# Patient Record
Sex: FEMALE | Race: WHITE | Employment: OTHER | URBAN - METROPOLITAN AREA
[De-identification: names, ages, dates, MRNs, and addresses within clinical notes are randomized per-mention and may not be internally consistent; named-entity substitution may affect disease eponyms.]

---

## 2017-11-21 ENCOUNTER — HOSPITAL ENCOUNTER (OUTPATIENT)
Dept: CT IMAGING | Age: 57
Discharge: HOME | End: 2017-11-21
Attending: INTERNAL MEDICINE
Payer: COMMERCIAL

## 2017-11-21 DIAGNOSIS — R10.13 DYSPEPSIA: ICD-10-CM

## 2017-11-21 DIAGNOSIS — R10.9 ABDOMINAL PAIN: ICD-10-CM

## 2017-11-21 DIAGNOSIS — M35.4 DIFFUSE (EOSINOPHILIC) FASCIITIS: ICD-10-CM

## 2017-11-21 DIAGNOSIS — K59.00 CONSTIPATION: ICD-10-CM

## 2017-11-21 PROCEDURE — 74011250636 HC RX REV CODE- 250/636: Performed by: INTERNAL MEDICINE

## 2017-11-21 PROCEDURE — 74177 CT ABD & PELVIS W/CONTRAST: CPT

## 2017-11-21 PROCEDURE — 74011000255 HC RX REV CODE- 255: Performed by: INTERNAL MEDICINE

## 2017-11-21 PROCEDURE — 74011636320 HC RX REV CODE- 636/320: Performed by: INTERNAL MEDICINE

## 2017-11-21 RX ORDER — SODIUM CHLORIDE 0.9 % (FLUSH) 0.9 %
10 SYRINGE (ML) INJECTION
Status: COMPLETED | OUTPATIENT
Start: 2017-11-21 | End: 2017-11-21

## 2017-11-21 RX ORDER — SODIUM CHLORIDE 9 MG/ML
50 INJECTION, SOLUTION INTRAVENOUS
Status: COMPLETED | OUTPATIENT
Start: 2017-11-21 | End: 2017-11-21

## 2017-11-21 RX ORDER — BARIUM SULFATE 20 MG/ML
900 SUSPENSION ORAL
Status: COMPLETED | OUTPATIENT
Start: 2017-11-21 | End: 2017-11-21

## 2017-11-21 RX ADMIN — BARIUM SULFATE 900 ML: 21 SUSPENSION ORAL at 07:40

## 2017-11-21 RX ADMIN — Medication 10 ML: at 07:40

## 2017-11-21 RX ADMIN — IOPAMIDOL 100 ML: 755 INJECTION, SOLUTION INTRAVENOUS at 07:40

## 2017-11-21 RX ADMIN — SODIUM CHLORIDE 50 ML/HR: 900 INJECTION, SOLUTION INTRAVENOUS at 07:40

## 2023-09-17 ENCOUNTER — HOSPITAL ENCOUNTER (INPATIENT)
Facility: HOSPITAL | Age: 63
LOS: 2 days | Discharge: HOME | DRG: 897 | End: 2023-09-19
Attending: STUDENT IN AN ORGANIZED HEALTH CARE EDUCATION/TRAINING PROGRAM | Admitting: FAMILY MEDICINE
Payer: COMMERCIAL

## 2023-09-17 DIAGNOSIS — F10.932 ALCOHOL WITHDRAWAL SYNDROME WITH PERCEPTUAL DISTURBANCE (HCC): Primary | ICD-10-CM

## 2023-09-17 DIAGNOSIS — F41.1 ANXIETY STATE: ICD-10-CM

## 2023-09-17 DIAGNOSIS — R45.1 AGITATION: ICD-10-CM

## 2023-09-17 DIAGNOSIS — D58.2 ELEVATED HEMOGLOBIN (HCC): ICD-10-CM

## 2023-09-17 DIAGNOSIS — M32.9 LUPUS (HCC): ICD-10-CM

## 2023-09-17 PROBLEM — F10.10 ALCOHOL ABUSE: Status: ACTIVE | Noted: 2023-09-17

## 2023-09-17 LAB
ALBUMIN SERPL-MCNC: 3.9 G/DL (ref 3.5–5)
ALBUMIN/GLOB SERPL: 1 (ref 1.1–2.2)
ALP SERPL-CCNC: 65 U/L (ref 45–117)
ALT SERPL-CCNC: 30 U/L (ref 12–78)
AMPHET UR QL SCN: NEGATIVE
ANION GAP SERPL CALC-SCNC: 9 MMOL/L (ref 5–15)
APAP SERPL-MCNC: <2 UG/ML (ref 10–30)
APPEARANCE UR: CLEAR
AST SERPL-CCNC: 49 U/L (ref 15–37)
BACTERIA URNS QL MICRO: NEGATIVE /HPF
BARBITURATES UR QL SCN: NEGATIVE
BASOPHILS # BLD: 0.1 K/UL (ref 0–0.1)
BASOPHILS NFR BLD: 1 % (ref 0–1)
BENZODIAZ UR QL: NEGATIVE
BILIRUB SERPL-MCNC: 0.7 MG/DL (ref 0.2–1)
BILIRUB UR QL: NEGATIVE
BUN SERPL-MCNC: 17 MG/DL (ref 6–20)
BUN/CREAT SERPL: 22 (ref 12–20)
CALCIUM SERPL-MCNC: 9 MG/DL (ref 8.5–10.1)
CANNABINOIDS UR QL SCN: NEGATIVE
CHLORIDE SERPL-SCNC: 102 MMOL/L (ref 97–108)
CO2 SERPL-SCNC: 28 MMOL/L (ref 21–32)
COCAINE UR QL SCN: NEGATIVE
COLOR UR: ABNORMAL
COMMENT:: NORMAL
CREAT SERPL-MCNC: 0.78 MG/DL (ref 0.55–1.02)
DIFFERENTIAL METHOD BLD: ABNORMAL
EOSINOPHIL # BLD: 0 K/UL (ref 0–0.4)
EOSINOPHIL NFR BLD: 0 % (ref 0–7)
EPITH CASTS URNS QL MICRO: ABNORMAL /LPF
ERYTHROCYTE [DISTWIDTH] IN BLOOD BY AUTOMATED COUNT: 13 % (ref 11.5–14.5)
ETHANOL SERPL-MCNC: 393 MG/DL (ref 0–0.08)
GLOBULIN SER CALC-MCNC: 4 G/DL (ref 2–4)
GLUCOSE SERPL-MCNC: 87 MG/DL (ref 65–100)
GLUCOSE UR STRIP.AUTO-MCNC: 250 MG/DL
HCT VFR BLD AUTO: 47.9 % (ref 35–47)
HGB BLD-MCNC: 16.7 G/DL (ref 11.5–16)
HGB UR QL STRIP: ABNORMAL
HYALINE CASTS URNS QL MICRO: ABNORMAL /LPF (ref 0–5)
IMM GRANULOCYTES # BLD AUTO: 0 K/UL (ref 0–0.04)
IMM GRANULOCYTES NFR BLD AUTO: 1 % (ref 0–0.5)
KETONES UR QL STRIP.AUTO: NEGATIVE MG/DL
LEUKOCYTE ESTERASE UR QL STRIP.AUTO: NEGATIVE
LYMPHOCYTES # BLD: 1.8 K/UL (ref 0.8–3.5)
LYMPHOCYTES NFR BLD: 24 % (ref 12–49)
Lab: NORMAL
MAGNESIUM SERPL-MCNC: 1.7 MG/DL (ref 1.6–2.4)
MCH RBC QN AUTO: 32.1 PG (ref 26–34)
MCHC RBC AUTO-ENTMCNC: 34.9 G/DL (ref 30–36.5)
MCV RBC AUTO: 92.1 FL (ref 80–99)
METHADONE UR QL: NEGATIVE
MONOCYTES # BLD: 0.4 K/UL (ref 0–1)
MONOCYTES NFR BLD: 6 % (ref 5–13)
NEUTS SEG # BLD: 5.3 K/UL (ref 1.8–8)
NEUTS SEG NFR BLD: 68 % (ref 32–75)
NITRITE UR QL STRIP.AUTO: NEGATIVE
NRBC # BLD: 0 K/UL (ref 0–0.01)
NRBC BLD-RTO: 0 PER 100 WBC
OPIATES UR QL: NEGATIVE
PCP UR QL: NEGATIVE
PH UR STRIP: 5.5 (ref 5–8)
PLATELET # BLD AUTO: 204 K/UL (ref 150–400)
PMV BLD AUTO: 8.8 FL (ref 8.9–12.9)
POTASSIUM SERPL-SCNC: 3.8 MMOL/L (ref 3.5–5.1)
PROT SERPL-MCNC: 7.9 G/DL (ref 6.4–8.2)
PROT UR STRIP-MCNC: ABNORMAL MG/DL
RBC # BLD AUTO: 5.2 M/UL (ref 3.8–5.2)
RBC #/AREA URNS HPF: ABNORMAL /HPF (ref 0–5)
SALICYLATES SERPL-MCNC: <1.7 MG/DL (ref 2.8–20)
SODIUM SERPL-SCNC: 139 MMOL/L (ref 136–145)
SP GR UR REFRACTOMETRY: 1.02 (ref 1–1.03)
SPECIMEN HOLD: NORMAL
UROBILINOGEN UR QL STRIP.AUTO: 0.2 EU/DL (ref 0.2–1)
WBC # BLD AUTO: 7.6 K/UL (ref 3.6–11)
WBC URNS QL MICRO: ABNORMAL /HPF (ref 0–4)

## 2023-09-17 PROCEDURE — 80053 COMPREHEN METABOLIC PANEL: CPT

## 2023-09-17 PROCEDURE — 96360 HYDRATION IV INFUSION INIT: CPT

## 2023-09-17 PROCEDURE — 36415 COLL VENOUS BLD VENIPUNCTURE: CPT

## 2023-09-17 PROCEDURE — 81001 URINALYSIS AUTO W/SCOPE: CPT

## 2023-09-17 PROCEDURE — 2580000003 HC RX 258: Performed by: FAMILY MEDICINE

## 2023-09-17 PROCEDURE — 83735 ASSAY OF MAGNESIUM: CPT

## 2023-09-17 PROCEDURE — 6360000002 HC RX W HCPCS: Performed by: FAMILY MEDICINE

## 2023-09-17 PROCEDURE — 80307 DRUG TEST PRSMV CHEM ANLYZR: CPT

## 2023-09-17 PROCEDURE — 96361 HYDRATE IV INFUSION ADD-ON: CPT

## 2023-09-17 PROCEDURE — 2580000003 HC RX 258

## 2023-09-17 PROCEDURE — 80143 DRUG ASSAY ACETAMINOPHEN: CPT

## 2023-09-17 PROCEDURE — 99285 EMERGENCY DEPT VISIT HI MDM: CPT

## 2023-09-17 PROCEDURE — 85025 COMPLETE CBC W/AUTO DIFF WBC: CPT

## 2023-09-17 PROCEDURE — 2060000000 HC ICU INTERMEDIATE R&B

## 2023-09-17 PROCEDURE — 6370000000 HC RX 637 (ALT 250 FOR IP)

## 2023-09-17 PROCEDURE — 82077 ASSAY SPEC XCP UR&BREATH IA: CPT

## 2023-09-17 PROCEDURE — 80179 DRUG ASSAY SALICYLATE: CPT

## 2023-09-17 RX ORDER — METOCLOPRAMIDE HYDROCHLORIDE 5 MG/ML
10 INJECTION INTRAMUSCULAR; INTRAVENOUS EVERY 6 HOURS
Status: DISCONTINUED | OUTPATIENT
Start: 2023-09-17 | End: 2023-09-19 | Stop reason: HOSPADM

## 2023-09-17 RX ORDER — SODIUM CHLORIDE, SODIUM LACTATE, POTASSIUM CHLORIDE, CALCIUM CHLORIDE 600; 310; 30; 20 MG/100ML; MG/100ML; MG/100ML; MG/100ML
INJECTION, SOLUTION INTRAVENOUS CONTINUOUS
Status: DISCONTINUED | OUTPATIENT
Start: 2023-09-17 | End: 2023-09-19 | Stop reason: HOSPADM

## 2023-09-17 RX ORDER — LORAZEPAM 2 MG/ML
2 INJECTION INTRAMUSCULAR
Status: DISCONTINUED | OUTPATIENT
Start: 2023-09-17 | End: 2023-09-19 | Stop reason: HOSPADM

## 2023-09-17 RX ORDER — ACETAMINOPHEN 650 MG/1
650 SUPPOSITORY RECTAL EVERY 6 HOURS PRN
Status: DISCONTINUED | OUTPATIENT
Start: 2023-09-17 | End: 2023-09-19 | Stop reason: HOSPADM

## 2023-09-17 RX ORDER — FINASTERIDE 5 MG/1
5 TABLET, FILM COATED ORAL DAILY
COMMUNITY

## 2023-09-17 RX ORDER — LORAZEPAM 1 MG/1
1 TABLET ORAL
Status: DISCONTINUED | OUTPATIENT
Start: 2023-09-17 | End: 2023-09-19 | Stop reason: HOSPADM

## 2023-09-17 RX ORDER — SODIUM CHLORIDE 0.9 % (FLUSH) 0.9 %
5-40 SYRINGE (ML) INJECTION PRN
Status: DISCONTINUED | OUTPATIENT
Start: 2023-09-17 | End: 2023-09-19 | Stop reason: HOSPADM

## 2023-09-17 RX ORDER — SODIUM CHLORIDE 9 MG/ML
INJECTION, SOLUTION INTRAVENOUS PRN
Status: DISCONTINUED | OUTPATIENT
Start: 2023-09-17 | End: 2023-09-19 | Stop reason: HOSPADM

## 2023-09-17 RX ORDER — LORAZEPAM 1 MG/1
2 TABLET ORAL
Status: DISCONTINUED | OUTPATIENT
Start: 2023-09-17 | End: 2023-09-19 | Stop reason: HOSPADM

## 2023-09-17 RX ORDER — LANOLIN ALCOHOL/MO/W.PET/CERES
100 CREAM (GRAM) TOPICAL DAILY
Status: DISCONTINUED | OUTPATIENT
Start: 2023-09-17 | End: 2023-09-19 | Stop reason: HOSPADM

## 2023-09-17 RX ORDER — 0.9 % SODIUM CHLORIDE 0.9 %
500 INTRAVENOUS SOLUTION INTRAVENOUS ONCE
Status: COMPLETED | OUTPATIENT
Start: 2023-09-17 | End: 2023-09-17

## 2023-09-17 RX ORDER — LORAZEPAM 2 MG/ML
3 INJECTION INTRAMUSCULAR
Status: DISCONTINUED | OUTPATIENT
Start: 2023-09-17 | End: 2023-09-19 | Stop reason: HOSPADM

## 2023-09-17 RX ORDER — SODIUM CHLORIDE 0.9 % (FLUSH) 0.9 %
5-40 SYRINGE (ML) INJECTION EVERY 12 HOURS SCHEDULED
Status: DISCONTINUED | OUTPATIENT
Start: 2023-09-17 | End: 2023-09-19 | Stop reason: HOSPADM

## 2023-09-17 RX ORDER — TRAZODONE HYDROCHLORIDE 100 MG/1
100 TABLET ORAL NIGHTLY
COMMUNITY

## 2023-09-17 RX ORDER — LORAZEPAM 1 MG/1
4 TABLET ORAL
Status: DISCONTINUED | OUTPATIENT
Start: 2023-09-17 | End: 2023-09-19 | Stop reason: HOSPADM

## 2023-09-17 RX ORDER — LORAZEPAM 2 MG/ML
1 INJECTION INTRAMUSCULAR
Status: DISCONTINUED | OUTPATIENT
Start: 2023-09-17 | End: 2023-09-19 | Stop reason: HOSPADM

## 2023-09-17 RX ORDER — ACETAMINOPHEN 325 MG/1
650 TABLET ORAL EVERY 6 HOURS PRN
Status: DISCONTINUED | OUTPATIENT
Start: 2023-09-17 | End: 2023-09-19 | Stop reason: HOSPADM

## 2023-09-17 RX ORDER — BUPROPION HYDROCHLORIDE 300 MG/1
300 TABLET ORAL EVERY MORNING
COMMUNITY

## 2023-09-17 RX ORDER — POLYETHYLENE GLYCOL 3350 17 G/17G
17 POWDER, FOR SOLUTION ORAL DAILY PRN
Status: DISCONTINUED | OUTPATIENT
Start: 2023-09-17 | End: 2023-09-19 | Stop reason: HOSPADM

## 2023-09-17 RX ORDER — CITALOPRAM 40 MG/1
40 TABLET ORAL DAILY
COMMUNITY

## 2023-09-17 RX ORDER — LORAZEPAM 2 MG/ML
4 INJECTION INTRAMUSCULAR
Status: DISCONTINUED | OUTPATIENT
Start: 2023-09-17 | End: 2023-09-19 | Stop reason: HOSPADM

## 2023-09-17 RX ORDER — ONDANSETRON 4 MG/1
4 TABLET, ORALLY DISINTEGRATING ORAL EVERY 8 HOURS PRN
Status: DISCONTINUED | OUTPATIENT
Start: 2023-09-17 | End: 2023-09-19 | Stop reason: HOSPADM

## 2023-09-17 RX ORDER — ONDANSETRON 2 MG/ML
4 INJECTION INTRAMUSCULAR; INTRAVENOUS EVERY 6 HOURS PRN
Status: DISCONTINUED | OUTPATIENT
Start: 2023-09-17 | End: 2023-09-19 | Stop reason: HOSPADM

## 2023-09-17 RX ORDER — OXYCODONE HYDROCHLORIDE 5 MG/1
5 TABLET ORAL EVERY 4 HOURS PRN
Status: DISCONTINUED | OUTPATIENT
Start: 2023-09-17 | End: 2023-09-19 | Stop reason: HOSPADM

## 2023-09-17 RX ORDER — ENOXAPARIN SODIUM 100 MG/ML
40 INJECTION SUBCUTANEOUS DAILY
Status: DISCONTINUED | OUTPATIENT
Start: 2023-09-17 | End: 2023-09-19 | Stop reason: HOSPADM

## 2023-09-17 RX ORDER — LORAZEPAM 1 MG/1
3 TABLET ORAL
Status: DISCONTINUED | OUTPATIENT
Start: 2023-09-17 | End: 2023-09-19 | Stop reason: HOSPADM

## 2023-09-17 RX ADMIN — SODIUM CHLORIDE, POTASSIUM CHLORIDE, SODIUM LACTATE AND CALCIUM CHLORIDE: 600; 310; 30; 20 INJECTION, SOLUTION INTRAVENOUS at 16:22

## 2023-09-17 RX ADMIN — ENOXAPARIN SODIUM 40 MG: 100 INJECTION SUBCUTANEOUS at 18:57

## 2023-09-17 RX ADMIN — LORAZEPAM 3 MG: 2 TABLET ORAL at 16:22

## 2023-09-17 RX ADMIN — SODIUM CHLORIDE 500 ML: 9 INJECTION, SOLUTION INTRAVENOUS at 13:45

## 2023-09-17 RX ADMIN — Medication 100 MG: at 16:22

## 2023-09-17 ASSESSMENT — PAIN SCALES - GENERAL: PAINLEVEL_OUTOF10: 5

## 2023-09-17 ASSESSMENT — PAIN DESCRIPTION - DESCRIPTORS: DESCRIPTORS: ACHING

## 2023-09-17 ASSESSMENT — PAIN - FUNCTIONAL ASSESSMENT
PAIN_FUNCTIONAL_ASSESSMENT: PREVENTS OR INTERFERES SOME ACTIVE ACTIVITIES AND ADLS
PAIN_FUNCTIONAL_ASSESSMENT: 0-10

## 2023-09-17 ASSESSMENT — PAIN DESCRIPTION - PAIN TYPE: TYPE: CHRONIC PAIN

## 2023-09-17 ASSESSMENT — PAIN DESCRIPTION - LOCATION: LOCATION: GENERALIZED

## 2023-09-17 ASSESSMENT — PAIN DESCRIPTION - FREQUENCY: FREQUENCY: CONTINUOUS

## 2023-09-17 ASSESSMENT — PAIN DESCRIPTION - ORIENTATION: ORIENTATION: ANTERIOR;POSTERIOR;LEFT;RIGHT

## 2023-09-17 ASSESSMENT — ENCOUNTER SYMPTOMS: SHORTNESS OF BREATH: 0

## 2023-09-17 ASSESSMENT — PAIN DESCRIPTION - ONSET: ONSET: ON-GOING

## 2023-09-17 NOTE — ED PROVIDER NOTES
Normal heart sounds. No murmur heard. Pulmonary:      Effort: Pulmonary effort is normal. No respiratory distress. Breath sounds: Normal breath sounds. Abdominal:      General: Abdomen is flat. There is no distension. Palpations: Abdomen is soft. Tenderness: There is no abdominal tenderness. Musculoskeletal:         General: Normal range of motion. Skin:     General: Skin is warm and dry. Neurological:      General: No focal deficit present. Mental Status: She is alert and oriented to person, place, and time. Cranial Nerves: No cranial nerve deficit. Psychiatric:         Attention and Perception: She is inattentive. She does not perceive auditory or visual hallucinations. Mood and Affect: Mood is anxious. Affect is labile and inappropriate. Behavior: Behavior is cooperative. Thought Content: Thought content does not include homicidal or suicidal ideation. Thought content does not include homicidal or suicidal plan. Comments: Laughing then crying during interview.           DIAGNOSTIC RESULTS     EKG: All EKG's are interpreted by the Emergency Department Physician who either signs or Co-signs this chart in the absence of a cardiologist.        RADIOLOGY:   Non-plain film images such as CT, Ultrasound and MRI are read by the radiologist. Plain radiographic images are visualized and preliminarily interpreted by the emergency physician with the below findings:        Interpretation per the Radiologist below, if available at the time of this note:    No orders to display        LABS:  Labs Reviewed   CBC WITH AUTO DIFFERENTIAL - Abnormal; Notable for the following components:       Result Value    Hemoglobin 16.7 (*)     Hematocrit 47.9 (*)     MPV 8.8 (*)     Immature Granulocytes 1 (*)     All other components within normal limits   COMPREHENSIVE METABOLIC PANEL - Abnormal; Notable for the following components:    Bun/Cre Ratio 22 (*)     AST 49 (*)

## 2023-09-17 NOTE — ED NOTES
Patient removed herself from monitor and was found at the sink drinking water. Patient becoming more agitated. Screamed at nurse: \"just give me some fucking water. \" Patient reoriented to room and aware of fall risk. Placed back on monitor and on bed alarm. Water provided.       Narinder Concepcion RN  09/17/23 [id number]

## 2023-09-17 NOTE — ED NOTES
Poison control called and was provided with updates on labs and vital signs. Per poison control, will continue to monitor with CIWA scale and medicate as needed.        Paulette Moura RN  09/17/23 9061

## 2023-09-17 NOTE — ED TRIAGE NOTES
Patient has been taking an increased amount of ETOH, Benadryl and Trazaone. Patient denies wanting to kill self but states wanting to stop the pain. Patient states having a flare up of Lupus and is having increased in pain. Patient has history of alcoholism. + Dizziness, + falls.

## 2023-09-17 NOTE — ED NOTES
MAYO Ralph aware of current CIWA score. Patient concerned that she is withdrawing. Declined reglan.        Jazmyn Vo RN  09/17/23 7530

## 2023-09-17 NOTE — H&P
alcohol rehab    Polycythemia  -Likely related to dehydration  -IV hydration with LR, monitor CBC    Mood disorder  -Patient with labile mood during assessment in the ER  -Supportive care, consider atypical antipsychotic for agitations    SLE  -Follow-up outpatient    Polypharmacy with possible overdose  -Continue cardiac monitor, follow QT  -Supportive management    Estimated length of stay is 2 midnights. DIET: No diet orders on file   ISOLATION PRECAUTIONS: No active isolations  CODE STATUS: No Order   DVT PROPHYLAXIS: Lovenox  FUNCTIONAL STATUS PRIOR TO HOSPITALIZATION: Fully active and ambulatory; able to carry on all self-care without restriction. Ambulatory status/function: By self   EARLY MOBILITY ASSESSMENT: Recommend routine ambulation while hospitalized with the assistance of nursing staff  ANTICIPATED DISCHARGE: 24-48 hours. ANTICIPATED DISPOSITION: Home  EMERGENCY CONTACT/SURROGATE DECISION MAKER:     CRITICAL CARE WAS PERFORMED FOR THIS ENCOUNTER: NO.      Signed By: Kitty Schreiber MD     September 17, 2023         Please note that this dictation may have been completed with Dragon, the computer voice recognition software. Quite often unanticipated grammatical, syntax, homophones, and other interpretive errors are inadvertently transcribed by the computer software. Please disregard these errors. Please excuse any errors that have escaped final proofreading.

## 2023-09-18 LAB
ANION GAP SERPL CALC-SCNC: 4 MMOL/L (ref 5–15)
BASOPHILS # BLD: 0 K/UL (ref 0–0.1)
BASOPHILS NFR BLD: 1 % (ref 0–1)
BUN SERPL-MCNC: 14 MG/DL (ref 6–20)
BUN/CREAT SERPL: 23 (ref 12–20)
CALCIUM SERPL-MCNC: 8.8 MG/DL (ref 8.5–10.1)
CHLORIDE SERPL-SCNC: 104 MMOL/L (ref 97–108)
CO2 SERPL-SCNC: 31 MMOL/L (ref 21–32)
CREAT SERPL-MCNC: 0.61 MG/DL (ref 0.55–1.02)
DIFFERENTIAL METHOD BLD: ABNORMAL
EOSINOPHIL # BLD: 0.1 K/UL (ref 0–0.4)
EOSINOPHIL NFR BLD: 1 % (ref 0–7)
ERYTHROCYTE [DISTWIDTH] IN BLOOD BY AUTOMATED COUNT: 12.9 % (ref 11.5–14.5)
GLUCOSE SERPL-MCNC: 104 MG/DL (ref 65–100)
HCT VFR BLD AUTO: 38.2 % (ref 35–47)
HGB BLD-MCNC: 13.2 G/DL (ref 11.5–16)
IMM GRANULOCYTES # BLD AUTO: 0 K/UL (ref 0–0.04)
IMM GRANULOCYTES NFR BLD AUTO: 0 % (ref 0–0.5)
LYMPHOCYTES # BLD: 1.1 K/UL (ref 0.8–3.5)
LYMPHOCYTES NFR BLD: 23 % (ref 12–49)
MCH RBC QN AUTO: 32 PG (ref 26–34)
MCHC RBC AUTO-ENTMCNC: 34.6 G/DL (ref 30–36.5)
MCV RBC AUTO: 92.5 FL (ref 80–99)
MONOCYTES # BLD: 0.5 K/UL (ref 0–1)
MONOCYTES NFR BLD: 11 % (ref 5–13)
NEUTS SEG # BLD: 3.2 K/UL (ref 1.8–8)
NEUTS SEG NFR BLD: 65 % (ref 32–75)
NRBC # BLD: 0 K/UL (ref 0–0.01)
NRBC BLD-RTO: 0 PER 100 WBC
PLATELET # BLD AUTO: 143 K/UL (ref 150–400)
PMV BLD AUTO: 8.9 FL (ref 8.9–12.9)
POTASSIUM SERPL-SCNC: 3.7 MMOL/L (ref 3.5–5.1)
RBC # BLD AUTO: 4.13 M/UL (ref 3.8–5.2)
SODIUM SERPL-SCNC: 139 MMOL/L (ref 136–145)
WBC # BLD AUTO: 5 K/UL (ref 3.6–11)

## 2023-09-18 PROCEDURE — 36415 COLL VENOUS BLD VENIPUNCTURE: CPT

## 2023-09-18 PROCEDURE — 85025 COMPLETE CBC W/AUTO DIFF WBC: CPT

## 2023-09-18 PROCEDURE — 6370000000 HC RX 637 (ALT 250 FOR IP): Performed by: FAMILY MEDICINE

## 2023-09-18 PROCEDURE — 80048 BASIC METABOLIC PNL TOTAL CA: CPT

## 2023-09-18 PROCEDURE — 6360000002 HC RX W HCPCS: Performed by: NURSE PRACTITIONER

## 2023-09-18 PROCEDURE — 2580000003 HC RX 258: Performed by: FAMILY MEDICINE

## 2023-09-18 PROCEDURE — 6370000000 HC RX 637 (ALT 250 FOR IP): Performed by: NURSE PRACTITIONER

## 2023-09-18 PROCEDURE — 6360000002 HC RX W HCPCS: Performed by: FAMILY MEDICINE

## 2023-09-18 PROCEDURE — 1100000000 HC RM PRIVATE

## 2023-09-18 RX ORDER — PHENOBARBITAL SODIUM 65 MG/ML
32.5 INJECTION INTRAMUSCULAR EVERY 6 HOURS PRN
Status: DISCONTINUED | OUTPATIENT
Start: 2023-09-18 | End: 2023-09-19 | Stop reason: HOSPADM

## 2023-09-18 RX ORDER — CITALOPRAM 20 MG/1
40 TABLET ORAL DAILY
Status: DISCONTINUED | OUTPATIENT
Start: 2023-09-19 | End: 2023-09-19 | Stop reason: HOSPADM

## 2023-09-18 RX ORDER — BUPROPION HYDROCHLORIDE 150 MG/1
150 TABLET, EXTENDED RELEASE ORAL 2 TIMES DAILY
Status: DISCONTINUED | OUTPATIENT
Start: 2023-09-18 | End: 2023-09-19 | Stop reason: HOSPADM

## 2023-09-18 RX ORDER — BUPROPION HYDROCHLORIDE 75 MG/1
150 TABLET ORAL 2 TIMES DAILY
Status: DISCONTINUED | OUTPATIENT
Start: 2023-09-18 | End: 2023-09-18

## 2023-09-18 RX ORDER — PHENOBARBITAL SODIUM 65 MG/ML
16.2 INJECTION INTRAMUSCULAR 2 TIMES DAILY
Status: DISCONTINUED | OUTPATIENT
Start: 2023-09-20 | End: 2023-09-19 | Stop reason: HOSPADM

## 2023-09-18 RX ORDER — PHENOBARBITAL SODIUM 65 MG/ML
32.5 INJECTION INTRAMUSCULAR 4 TIMES DAILY
Status: DISPENSED | OUTPATIENT
Start: 2023-09-18 | End: 2023-09-19

## 2023-09-18 RX ORDER — PHENOBARBITAL SODIUM 65 MG/ML
16.2 INJECTION INTRAMUSCULAR EVERY 6 HOURS PRN
Status: DISCONTINUED | OUTPATIENT
Start: 2023-09-20 | End: 2023-09-19 | Stop reason: HOSPADM

## 2023-09-18 RX ORDER — PHENOBARBITAL SODIUM 65 MG/ML
32.5 INJECTION INTRAMUSCULAR 2 TIMES DAILY
Status: DISCONTINUED | OUTPATIENT
Start: 2023-09-19 | End: 2023-09-19 | Stop reason: HOSPADM

## 2023-09-18 RX ADMIN — METOCLOPRAMIDE HYDROCHLORIDE 10 MG: 5 INJECTION INTRAMUSCULAR; INTRAVENOUS at 10:11

## 2023-09-18 RX ADMIN — LORAZEPAM 1 MG: 0.5 TABLET ORAL at 20:42

## 2023-09-18 RX ADMIN — METOCLOPRAMIDE HYDROCHLORIDE 10 MG: 5 INJECTION INTRAMUSCULAR; INTRAVENOUS at 20:42

## 2023-09-18 RX ADMIN — ENOXAPARIN SODIUM 40 MG: 100 INJECTION SUBCUTANEOUS at 10:11

## 2023-09-18 RX ADMIN — BUPROPION HYDROCHLORIDE 150 MG: 150 TABLET, EXTENDED RELEASE ORAL at 15:06

## 2023-09-18 RX ADMIN — ONDANSETRON 4 MG: 2 INJECTION INTRAMUSCULAR; INTRAVENOUS at 04:43

## 2023-09-18 RX ADMIN — PHENOBARBITAL SODIUM 32.5 MG: 65 INJECTION INTRAMUSCULAR at 10:11

## 2023-09-18 RX ADMIN — SODIUM CHLORIDE, POTASSIUM CHLORIDE, SODIUM LACTATE AND CALCIUM CHLORIDE: 600; 310; 30; 20 INJECTION, SOLUTION INTRAVENOUS at 04:52

## 2023-09-18 RX ADMIN — Medication 100 MG: at 10:11

## 2023-09-18 RX ADMIN — OXYCODONE HYDROCHLORIDE 5 MG: 5 TABLET ORAL at 15:09

## 2023-09-18 RX ADMIN — LORAZEPAM 1 MG: 2 INJECTION INTRAMUSCULAR; INTRAVENOUS at 04:52

## 2023-09-18 ASSESSMENT — PAIN SCALES - GENERAL: PAINLEVEL_OUTOF10: 6

## 2023-09-18 NOTE — ED NOTES
Assumed care of pt at this time. Pt a+o, denies any pain. Additional blanket applied per request.  Call bell within reach.      Jarret Rosas RN  09/18/23 [id number]

## 2023-09-18 NOTE — BSMART NOTE
Spoke w/ the ED Medical Provider, MAYO Alfaro who confirms pt is being recommended for medical admission at this time and will consult psychiatry.
discharge. The patient's source of income comes from family and [employer] (was ). The patient's greatest support comes from spouse and this person will be involved with the treatment. The patient has not been in an event described as horrible or outside the realm of ordinary life experience either currently or in the past. The patient has not been a victim of sexual/physical abuse. Section VII - Other Areas of Clinical Concern    The highest grade achieved is college with the overall quality of school experience being described as n/a. The patient is currently retired and speaks [place] as a primary language. The patient has no communication impairments affecting communication. The patient's preference for learning can be described as: can read and write adequately.   The patient's hearing is normal.  The patient's vision is normal.      BLANCA Ohara

## 2023-09-18 NOTE — CONSULTS
PSYCHIATRY CONSULT NOTE    REASON FOR CONSULT: Alcohol use      HISTORY OF PRESENTING COMPLAINT:  Keren Artis is a 58 y.o. White (non-) female who is currently seen in the ED at [facility]. Patient came in due to alcohol intoxication. She came in with an alcohol level of 393. Reportedly, she has also been self medicating with alcohol in combination with 2-3 pills of benadryl and trazodone to cope with lupus symptoms. On assessment today, she is alert, calm and cooperative during the assessment. Also tremulous and yarning multiple times during the assessment. She reports she feels better today. She reports lupus flare up 2 weeks ago when she went to the beach and has been using trazodone, benadryl and alcohol to cope with her symptoms. She states she was taking 2 pills each of trazodone and benadryl q 4 hours for the past 2 weeks(dosage of benadryl is unknown, states it's over the counter benadryl, I'm assuming 25mg. States trazodone is 50mg). She is adamant that this was not a suicide attempt but to sleep and not feel the pains from lupus. She denies hx of suicide attempt. She denies current SI/HI/AVH. She denies depressive symptoms including hopelessness, anhedonia, anergia and lack of motivation. She reports poor sleep and appetite since the lupus flare up. But reports she was hungry and ask when breakfast will be served. She denies paranoia and delusions and is not observed responding to internal stimuli. PAST PSYCHIATRIC HISTORY:  Denies hx of inpatient psych admission and suicide attempt. Reports a hx of depression and has been taking Celexa, Wellbutrin and trazodone for years through her PCP. Does not currently have outpatient [address] [place] [address] services. SUBSTANCE ABUSE HISTORY:  -Alcohol use: Patient reports alcohol use on and off. Has been sober for 6 years and relapse during the Covid period. Recently she was sober for months and relapse again one weeks ago due to lupus flare up.  She states

## 2023-09-18 NOTE — ED NOTES
Poison control updated on pt status. Per pc they do not feel she will have anymore events related to her ingestion prior to arriving hospital so case will be closed out.         Jarret Rosas RN  09/18/23 9229

## 2023-09-19 VITALS
DIASTOLIC BLOOD PRESSURE: 100 MMHG | BODY MASS INDEX: 20.83 KG/M2 | WEIGHT: 129.63 LBS | HEART RATE: 64 BPM | RESPIRATION RATE: 16 BRPM | HEIGHT: 66 IN | OXYGEN SATURATION: 96 % | TEMPERATURE: 98.1 F | SYSTOLIC BLOOD PRESSURE: 176 MMHG

## 2023-09-19 LAB
ANION GAP SERPL CALC-SCNC: 2 MMOL/L (ref 5–15)
BUN SERPL-MCNC: 9 MG/DL (ref 6–20)
BUN/CREAT SERPL: 16 (ref 12–20)
CALCIUM SERPL-MCNC: 8.7 MG/DL (ref 8.5–10.1)
CHLORIDE SERPL-SCNC: 105 MMOL/L (ref 97–108)
CO2 SERPL-SCNC: 32 MMOL/L (ref 21–32)
COMMENT:: NORMAL
CREAT SERPL-MCNC: 0.58 MG/DL (ref 0.55–1.02)
GLUCOSE SERPL-MCNC: 102 MG/DL (ref 65–100)
MAGNESIUM SERPL-MCNC: 1.7 MG/DL (ref 1.6–2.4)
POTASSIUM SERPL-SCNC: 3.3 MMOL/L (ref 3.5–5.1)
SODIUM SERPL-SCNC: 139 MMOL/L (ref 136–145)
SPECIMEN HOLD: NORMAL

## 2023-09-19 PROCEDURE — 6360000002 HC RX W HCPCS: Performed by: NURSE PRACTITIONER

## 2023-09-19 PROCEDURE — 36415 COLL VENOUS BLD VENIPUNCTURE: CPT

## 2023-09-19 PROCEDURE — 83735 ASSAY OF MAGNESIUM: CPT

## 2023-09-19 PROCEDURE — 6370000000 HC RX 637 (ALT 250 FOR IP): Performed by: FAMILY MEDICINE

## 2023-09-19 PROCEDURE — 6360000002 HC RX W HCPCS: Performed by: FAMILY MEDICINE

## 2023-09-19 PROCEDURE — 6370000000 HC RX 637 (ALT 250 FOR IP): Performed by: NURSE PRACTITIONER

## 2023-09-19 PROCEDURE — 80048 BASIC METABOLIC PNL TOTAL CA: CPT

## 2023-09-19 PROCEDURE — 2580000003 HC RX 258: Performed by: FAMILY MEDICINE

## 2023-09-19 RX ORDER — POTASSIUM CHLORIDE 750 MG/1
40 TABLET, FILM COATED, EXTENDED RELEASE ORAL ONCE
Status: COMPLETED | OUTPATIENT
Start: 2023-09-19 | End: 2023-09-19

## 2023-09-19 RX ADMIN — CITALOPRAM HYDROBROMIDE 40 MG: 20 TABLET ORAL at 09:59

## 2023-09-19 RX ADMIN — METOCLOPRAMIDE HYDROCHLORIDE 10 MG: 5 INJECTION INTRAMUSCULAR; INTRAVENOUS at 02:23

## 2023-09-19 RX ADMIN — BUPROPION HYDROCHLORIDE 150 MG: 150 TABLET, EXTENDED RELEASE ORAL at 09:59

## 2023-09-19 RX ADMIN — SODIUM CHLORIDE, PRESERVATIVE FREE 10 ML: 5 INJECTION INTRAVENOUS at 10:01

## 2023-09-19 RX ADMIN — SODIUM CHLORIDE, POTASSIUM CHLORIDE, SODIUM LACTATE AND CALCIUM CHLORIDE 125 ML: 600; 310; 30; 20 INJECTION, SOLUTION INTRAVENOUS at 00:20

## 2023-09-19 RX ADMIN — Medication 100 MG: at 09:59

## 2023-09-19 RX ADMIN — POTASSIUM CHLORIDE 40 MEQ: 750 TABLET, FILM COATED, EXTENDED RELEASE ORAL at 09:59

## 2023-09-19 RX ADMIN — METOCLOPRAMIDE HYDROCHLORIDE 10 MG: 5 INJECTION INTRAMUSCULAR; INTRAVENOUS at 09:59

## 2023-09-19 RX ADMIN — SODIUM CHLORIDE, PRESERVATIVE FREE 10 ML: 5 INJECTION INTRAVENOUS at 10:00

## 2023-09-19 NOTE — CONSULTS
PSYCHIATRY CONSULT NOTE    REASON FOR CONSULT: Alcohol use    Interval history  9/19/23: Ms Raul Echols reports doing better and asked about discharge. She appears to have improved from yesterday, denies SI/HI/AVH. He states she slept well and eating well. She is interested in IOP. She also wants a referral to a rheumatologist. She denies any concerns at this time. No behavioral issues per nursing staff. HISTORY OF PRESENTING COMPLAINT:  Sidney Frausto is a 58 y.o. White (non-) female who is currently seen in the ED at [facility]. Patient came in due to alcohol intoxication. She came in with an alcohol level of 393. Reportedly, she has also been self medicating with alcohol in combination with 2-3 pills of benadryl and trazodone to cope with lupus symptoms. On assessment today, she is alert, calm and cooperative during the assessment. Also tremulous and yarning multiple times during the assessment. She reports she feels better today. She reports lupus flare up 2 weeks ago when she went to the beach and has been using trazodone, benadryl and alcohol to cope with her symptoms. She states she was taking 2 pills each of trazodone and benadryl q 4 hours for the past 2 weeks(dosage of benadryl is unknown, states it's over the counter benadryl, I'm assuming 25mg. States trazodone is 50mg). She is adamant that this was not a suicide attempt but to sleep and not feel the pains from lupus. She denies hx of suicide attempt. She denies current SI/HI/AVH. She denies depressive symptoms including hopelessness, anhedonia, anergia and lack of motivation. She reports poor sleep and appetite since the lupus flare up. But reports she was hungry and ask when breakfast will be served. She denies paranoia and delusions and is not observed responding to internal stimuli. PAST PSYCHIATRIC HISTORY:  Denies hx of inpatient psych admission and suicide attempt.  Reports a hx of depression and has been taking Celexa,

## 2023-09-19 NOTE — ED NOTES
.. TRANSFER - OUT REPORT:    Verbal report given to simone Figueroa  being transferred to  for routine progression of patient care       Report consisted of patient's Situation, Background, Assessment and   Recommendations(SBAR). Information from the following report(s) Nurse Handoff Report, ED SBAR, Adult Overview, Recent Results, and Neuro Assessment was reviewed with the receiving nurse. Mayer Fall Assessment:    Presents to emergency department  because of falls (Syncope, seizure, or loss of consciousness): No  Age > 70: No  Altered Mental Status, Intoxication with alcohol or substance confusion (Disorientation, impaired judgment, poor safety awaremess, or inability to follow instructions): Yes  Impaired Mobility: Ambulates or transfers with assistive devices or assistance; Unable to ambulate or transer.: Yes  Nursing Judgement: Yes          Lines:   Peripheral IV 09/18/23 Right Forearm (Active)        Opportunity for questions and clarification was provided.       Patient transported with:  Registered Nurse          Mallika Barba RN  09/18/23 [id number]

## 2023-09-19 NOTE — CARE COORDINATION
Services At/After Discharge [facility]  (list of outpatient and inpatient subsatnce abuse programs provided to patient)   Irasema Provided?  No   Mode of Transport at Discharge Other (see comment)  ()      JOSEFINA Bledsoe

## 2023-11-13 ENCOUNTER — OFFICE VISIT (OUTPATIENT)
Age: 63
End: 2023-11-13
Payer: COMMERCIAL

## 2023-11-13 ENCOUNTER — HOSPITAL ENCOUNTER (OUTPATIENT)
Facility: HOSPITAL | Age: 63
Discharge: HOME | End: 2023-11-15
Attending: PSYCHIATRY & NEUROLOGY
Payer: COMMERCIAL

## 2023-11-13 ENCOUNTER — TELEPHONE (OUTPATIENT)
Age: 63
End: 2023-11-13

## 2023-11-13 VITALS
DIASTOLIC BLOOD PRESSURE: 80 MMHG | RESPIRATION RATE: 18 BRPM | BODY MASS INDEX: 23.11 KG/M2 | OXYGEN SATURATION: 98 % | SYSTOLIC BLOOD PRESSURE: 130 MMHG | TEMPERATURE: 97.3 F | HEART RATE: 51 BPM | HEIGHT: 66 IN | WEIGHT: 143.8 LBS

## 2023-11-13 DIAGNOSIS — I65.23 BILATERAL CAROTID ARTERY STENOSIS: ICD-10-CM

## 2023-11-13 DIAGNOSIS — I67.89 CEREBRAL MICROVASCULAR DISEASE: ICD-10-CM

## 2023-11-13 DIAGNOSIS — E55.9 VITAMIN D DEFICIENCY: Primary | ICD-10-CM

## 2023-11-13 DIAGNOSIS — E53.8 VITAMIN B12 DEFICIENCY: ICD-10-CM

## 2023-11-13 DIAGNOSIS — R41.3 DISTURBANCE OF MEMORY: ICD-10-CM

## 2023-11-13 DIAGNOSIS — F90.2 ATTENTION DEFICIT HYPERACTIVITY DISORDER (ADHD), COMBINED TYPE: ICD-10-CM

## 2023-11-13 LAB
ECHO BSA: 1.74 M2
VAS LEFT CCA DIST EDV: 25.4 CM/S
VAS LEFT CCA DIST PSV: 83.7 CM/S
VAS LEFT CCA PROX EDV: 23.5 CM/S
VAS LEFT CCA PROX PSV: 92.9 CM/S
VAS LEFT ECA EDV: 10.8 CM/S
VAS LEFT ECA PSV: 91 CM/S
VAS LEFT ICA DIST EDV: 23.5 CM/S
VAS LEFT ICA DIST PSV: 54.6 CM/S
VAS LEFT ICA MID EDV: 27.2 CM/S
VAS LEFT ICA MID PSV: 67.3 CM/S
VAS LEFT ICA PROX EDV: 16.2 CM/S
VAS LEFT ICA PROX PSV: 81.9 CM/S
VAS LEFT ICA/CCA PSV: 1 NO UNITS
VAS LEFT SUBCLAVIAN PROX EDV: 0 CM/S
VAS LEFT SUBCLAVIAN PROX PSV: 87.4 CM/S
VAS LEFT VERTEBRAL EDV: 19.9 CM/S
VAS LEFT VERTEBRAL PSV: 72.8 CM/S
VAS RIGHT CCA DIST EDV: 25.4 CM/S
VAS RIGHT CCA DIST PSV: 72.9 CM/S
VAS RIGHT CCA PROX EDV: 23.6 CM/S
VAS RIGHT CCA PROX PSV: 96.6 CM/S
VAS RIGHT ECA EDV: 9 CM/S
VAS RIGHT ECA PSV: 72.9 CM/S
VAS RIGHT ICA DIST EDV: 30.9 CM/S
VAS RIGHT ICA DIST PSV: 82 CM/S
VAS RIGHT ICA MID EDV: 30.9 CM/S
VAS RIGHT ICA MID PSV: 109.4 CM/S
VAS RIGHT ICA PROX EDV: 25.4 CM/S
VAS RIGHT ICA PROX PSV: 78.4 CM/S
VAS RIGHT ICA/CCA PSV: 1.5 NO UNITS
VAS RIGHT SUBCLAVIAN PROX EDV: 0 CM/S
VAS RIGHT SUBCLAVIAN PROX PSV: 114.9 CM/S
VAS RIGHT VERTEBRAL EDV: 12.6 CM/S
VAS RIGHT VERTEBRAL PSV: 41.8 CM/S

## 2023-11-13 PROCEDURE — G8484 FLU IMMUNIZE NO ADMIN: HCPCS | Performed by: PSYCHIATRY & NEUROLOGY

## 2023-11-13 PROCEDURE — 93880 EXTRACRANIAL BILAT STUDY: CPT | Performed by: PSYCHIATRY & NEUROLOGY

## 2023-11-13 PROCEDURE — 99205 OFFICE O/P NEW HI 60 MIN: CPT | Performed by: PSYCHIATRY & NEUROLOGY

## 2023-11-13 PROCEDURE — G8427 DOCREV CUR MEDS BY ELIG CLIN: HCPCS | Performed by: PSYCHIATRY & NEUROLOGY

## 2023-11-13 PROCEDURE — 93880 EXTRACRANIAL BILAT STUDY: CPT

## 2023-11-13 PROCEDURE — G8420 CALC BMI NORM PARAMETERS: HCPCS | Performed by: PSYCHIATRY & NEUROLOGY

## 2023-11-13 RX ORDER — ACETAMINOPHEN 160 MG
TABLET,DISINTEGRATING ORAL
COMMUNITY

## 2023-11-13 ASSESSMENT — PATIENT HEALTH QUESTIONNAIRE - PHQ9
SUM OF ALL RESPONSES TO PHQ QUESTIONS 1-9: 0
1. LITTLE INTEREST OR PLEASURE IN DOING THINGS: 0
SUM OF ALL RESPONSES TO PHQ QUESTIONS 1-9: 0
SUM OF ALL RESPONSES TO PHQ QUESTIONS 1-9: 0
2. FEELING DOWN, DEPRESSED OR HOPELESS: 0
SUM OF ALL RESPONSES TO PHQ9 QUESTIONS 1 & 2: 0
SUM OF ALL RESPONSES TO PHQ QUESTIONS 1-9: 0

## 2023-11-13 NOTE — TELEPHONE ENCOUNTER
PASCUAL Beavervd a call from Madi at [facility] vascular lab that he worked the pt in today for her doppler

## 2023-11-14 LAB
25(OH)D3 SERPL-MCNC: 56.9 NG/ML (ref 30–100)
FOLATE SERPL-MCNC: 21.9 NG/ML (ref 5–21)
VIT B12 SERPL-MCNC: 900 PG/ML (ref 193–986)

## 2023-11-14 NOTE — PROGRESS NOTES
Consult  REFERRED BY:  Harshad Brown MD    CHIEF COMPLAINT: Memory loss for the last 3 years. Subjective:     Toby Husbands is a 58 y.o. right-handed  female we are seeing as a new patient, at the request of Dr. Bacilio Helton for new problem of memory loss that has been progressive for the last 3 years and seems to be involve more forgetting where she puts things, forgetting to close refrigerator door, to turn off the water faucet, and noticed by her  but not many other people. 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 She majors in finance. She thinks both of her parents had dementia, and the father was diagnosed with Alzheimer disease in her mother had cognitive issues before she  of breast cancer. The patient had no unusual trauma, but has been depressed for years, but actually does not feel increasingly depressed and actually feels better than she has in a long time. She has she exercises quite a bit and tries eat a good diet, and tries to stay mentally active also. She has no other family history of problems of memory loss, and no other major metabolic or medical problems because of dementia, but did have a history of alcohol abuse in the past, even as recently as of 2023, but says she has now quit drinking for good. We encouraged her to take a multivitamin with vitamin D every day, in addition to continue to stay physically active mentally active and eating a Mediterranean type diet which she will try to do, because that she has not been doing eating lots of carbohydrates. She takes Wellbutrin, Celexa, and trazodone for her depression has been on those doses for quite some time and sees her therapist regularly. We encouraged her to take multivitamins and a vitamin D on a regular basis also. She has never had a history of TIA or stroke or major head trauma.   She has had no imaging of the head in the past, and had thyroid test by her

## 2023-11-20 ENCOUNTER — CLINICAL DOCUMENTATION (OUTPATIENT)
Age: 63
End: 2023-11-20

## 2023-11-20 NOTE — PROGRESS NOTES
I called the patient, no answer, left message for Dopplers were fine showed minimal disease and she should check MyChart for results of her test

## 2024-08-01 RX ORDER — OXYCODONE HYDROCHLORIDE 5 MG/1
5 TABLET ORAL EVERY 4 HOURS PRN
Status: ON HOLD | COMMUNITY
Start: 2024-07-17 | End: 2024-08-07

## 2024-08-01 NOTE — PERIOP NOTE
an antibacterial soap, such as Dial or the soap provided at your preassessment appointment. If needed, ask someone for help to reach all areas of your body. Don’t forget to clean your belly button! Rinse.  With bottle of Hibiclens/Chlorhexidine in hand, turn water off.  Apply Hibiclens antiseptic skin cleanser with a clean, freshly washed washcloth.  Gently apply to your body from chin to toes (except the genital area) and especially the area(s) where your incision(s) will be.  Leave Hibiclens/Chlorhexidine on your skin for at least 20 seconds.     CAUTION: If needed, Hibiclens/chlorhexidine may be used to clean the folds of skin of the legs (such as in the area of the groin) and on your buttocks and hips. However, do not use Hibiclens/Chlorhexidine above the neck or in the genital area (your bottom) or put inside any area of your body.  Turn the water back on and rinse.  Dry gently with a clean, freshly washed towel.  After your shower, do not use any powder, deodorant, perfumes or lotion prior to surgery.  Put on clean, freshly washed clothing.    Tips to help prevent infections after your surgery:  Protect your surgical wound from germs:  Hand washing is the most important thing you and your caregivers can do to prevent infections.  Keep your bandage clean and dry!  Do not touch your surgical wound.  Use clean, freshly washed towels and washcloths every time you shower; do not share bath linens with others.  Until your surgical wound is healed, wear clothing and sleep on bed linens each day that are clean and freshly washed.  Do not allow pets to sleep in your bed with you or touch your surgical wound.  Do not smoke - smoking delays wound healing. This may be a good time to stop smoking.  If you have diabetes, it is important for you to manage your blood sugar levels properly before your surgery as well as after your surgery. Poorly managed blood sugar levels slow down wound healing and prevent you from healing

## 2024-08-06 ENCOUNTER — ANESTHESIA EVENT (OUTPATIENT)
Facility: HOSPITAL | Age: 64
End: 2024-08-06
Payer: MEDICARE

## 2024-08-06 NOTE — ANESTHESIA PRE PROCEDURE
Department of Anesthesiology  Preprocedure Note       Name:  Luz Hills   Age:  64 y.o.  :  1960                                          MRN:  [id number]         Date:  2024      Surgeon: Surgeon(s):  Mickey May MD    Procedure: Procedure(s):  LEFT OPEN REDUCTION INTERNAL FIXATION OF CLAVICLE FRACTURE (ANES GENERAL WITH REGIONAL BLOCK)    Medications prior to admission:   Prior to Admission medications    Medication Sig Start Date End Date Taking? Authorizing Provider   oxyCODONE (ROXICODONE) 5 MG immediate release tablet Take 1 tablet by mouth every 4 hours as needed for Pain. 24  Yes Sepideh Specner MD   Turmeric (QC TUMERIC COMPLEX PO) Take 1 tablet by mouth daily   Yes Sepideh Spencer MD   Cholecalciferol (VITAMIN D3) 50 MCG ( UT) CAPS Take by mouth    Sepideh Spencer MD   buPROPion (WELLBUTRIN XL) 300 MG extended release tablet Take 1 tablet by mouth every morning    Sepideh Spencer MD   citalopram (CELEXA) 40 MG tablet Take 1 tablet by mouth daily    Sepideh Spencer MD   traZODone (DESYREL) 100 MG tablet Take 1 tablet by mouth nightly    Sepideh Spencer MD   Multiple Vitamin (MULTIVITAMIN ADULT PO) Take 1 tablet by mouth daily    ProviderSepideh MD       Current medications:    No current facility-administered medications for this encounter.     Current Outpatient Medications   Medication Sig Dispense Refill   • oxyCODONE (ROXICODONE) 5 MG immediate release tablet Take 1 tablet by mouth every 4 hours as needed for Pain.     • Turmeric (QC TUMERIC COMPLEX PO) Take 1 tablet by mouth daily     • Cholecalciferol (VITAMIN D3) 50 MCG ( UT) CAPS Take by mouth     • buPROPion (WELLBUTRIN XL) 300 MG extended release tablet Take 1 tablet by mouth every morning     • citalopram (CELEXA) 40 MG tablet Take 1 tablet by mouth daily     • traZODone (DESYREL) 100 MG tablet Take 1 tablet by mouth nightly     • Multiple Vitamin (MULTIVITAMIN ADULT

## 2024-08-07 ENCOUNTER — HOSPITAL ENCOUNTER (OUTPATIENT)
Facility: HOSPITAL | Age: 64
Setting detail: OUTPATIENT SURGERY
Discharge: HOME | End: 2024-08-07
Attending: ORTHOPAEDIC SURGERY | Admitting: ORTHOPAEDIC SURGERY
Payer: MEDICARE

## 2024-08-07 ENCOUNTER — APPOINTMENT (OUTPATIENT)
Facility: HOSPITAL | Age: 64
End: 2024-08-07
Attending: ORTHOPAEDIC SURGERY
Payer: MEDICARE

## 2024-08-07 ENCOUNTER — ANESTHESIA (OUTPATIENT)
Facility: HOSPITAL | Age: 64
End: 2024-08-07
Payer: MEDICARE

## 2024-08-07 VITALS
BODY MASS INDEX: 23.46 KG/M2 | OXYGEN SATURATION: 95 % | RESPIRATION RATE: 12 BRPM | WEIGHT: 146 LBS | DIASTOLIC BLOOD PRESSURE: 84 MMHG | HEIGHT: 66 IN | HEART RATE: 55 BPM | TEMPERATURE: 98.4 F | SYSTOLIC BLOOD PRESSURE: 121 MMHG

## 2024-08-07 DIAGNOSIS — S42.032A CLOSED DISPLACED FRACTURE OF ACROMIAL END OF LEFT CLAVICLE, INITIAL ENCOUNTER: Primary | ICD-10-CM

## 2024-08-07 PROCEDURE — 3600000014 HC SURGERY LEVEL 4 ADDTL 15MIN: Performed by: ORTHOPAEDIC SURGERY

## 2024-08-07 PROCEDURE — 64415 NJX AA&/STRD BRCH PLXS IMG: CPT | Performed by: ANESTHESIOLOGY

## 2024-08-07 PROCEDURE — 2580000003 HC RX 258: Performed by: ORTHOPAEDIC SURGERY

## 2024-08-07 PROCEDURE — 3600000004 HC SURGERY LEVEL 4 BASE: Performed by: ORTHOPAEDIC SURGERY

## 2024-08-07 PROCEDURE — C1769 GUIDE WIRE: HCPCS | Performed by: ORTHOPAEDIC SURGERY

## 2024-08-07 PROCEDURE — 3700000000 HC ANESTHESIA ATTENDED CARE: Performed by: ORTHOPAEDIC SURGERY

## 2024-08-07 PROCEDURE — C1713 ANCHOR/SCREW BN/BN,TIS/BN: HCPCS | Performed by: ORTHOPAEDIC SURGERY

## 2024-08-07 PROCEDURE — 7100000001 HC PACU RECOVERY - ADDTL 15 MIN: Performed by: ORTHOPAEDIC SURGERY

## 2024-08-07 PROCEDURE — 2500000003 HC RX 250 WO HCPCS

## 2024-08-07 PROCEDURE — 7100000010 HC PHASE II RECOVERY - FIRST 15 MIN: Performed by: ORTHOPAEDIC SURGERY

## 2024-08-07 PROCEDURE — 7100000000 HC PACU RECOVERY - FIRST 15 MIN: Performed by: ORTHOPAEDIC SURGERY

## 2024-08-07 PROCEDURE — 7100000011 HC PHASE II RECOVERY - ADDTL 15 MIN: Performed by: ORTHOPAEDIC SURGERY

## 2024-08-07 PROCEDURE — 6360000002 HC RX W HCPCS: Performed by: ANESTHESIOLOGY

## 2024-08-07 PROCEDURE — 2580000003 HC RX 258: Performed by: STUDENT IN AN ORGANIZED HEALTH CARE EDUCATION/TRAINING PROGRAM

## 2024-08-07 PROCEDURE — 76942 ECHO GUIDE FOR BIOPSY: CPT | Performed by: ANESTHESIOLOGY

## 2024-08-07 PROCEDURE — 6360000002 HC RX W HCPCS: Performed by: ORTHOPAEDIC SURGERY

## 2024-08-07 PROCEDURE — 3700000001 HC ADD 15 MINUTES (ANESTHESIA): Performed by: ORTHOPAEDIC SURGERY

## 2024-08-07 PROCEDURE — 2580000003 HC RX 258

## 2024-08-07 PROCEDURE — 6360000002 HC RX W HCPCS

## 2024-08-07 PROCEDURE — 2720000010 HC SURG SUPPLY STERILE: Performed by: ORTHOPAEDIC SURGERY

## 2024-08-07 PROCEDURE — 2709999900 HC NON-CHARGEABLE SUPPLY: Performed by: ORTHOPAEDIC SURGERY

## 2024-08-07 DEVICE — LOCKING SCREW
Type: IMPLANTABLE DEVICE | Site: CLAVICLE | Status: FUNCTIONAL
Brand: VARIAX

## 2024-08-07 DEVICE — BONE SCREW
Type: IMPLANTABLE DEVICE | Site: CLAVICLE | Status: FUNCTIONAL
Brand: VARIAX

## 2024-08-07 DEVICE — SUPERIOR LATERAL PLATE
Type: IMPLANTABLE DEVICE | Site: CLAVICLE | Status: FUNCTIONAL
Brand: VARIAX

## 2024-08-07 DEVICE — LOCKING SCREW, FULLY THREADED, T10
Type: IMPLANTABLE DEVICE | Site: CLAVICLE | Status: FUNCTIONAL
Brand: VARIAX

## 2024-08-07 RX ORDER — TRANEXAMIC ACID 100 MG/ML
INJECTION, SOLUTION INTRAVENOUS PRN
Status: DISCONTINUED | OUTPATIENT
Start: 2024-08-07 | End: 2024-08-07 | Stop reason: SDUPTHER

## 2024-08-07 RX ORDER — ONDANSETRON 4 MG/1
4 TABLET, ORALLY DISINTEGRATING ORAL EVERY 8 HOURS PRN
Qty: 10 TABLET | Refills: 0 | Status: SHIPPED | OUTPATIENT
Start: 2024-08-07

## 2024-08-07 RX ORDER — SODIUM CHLORIDE 0.9 % (FLUSH) 0.9 %
5-40 SYRINGE (ML) INJECTION EVERY 12 HOURS SCHEDULED
Status: DISCONTINUED | OUTPATIENT
Start: 2024-08-07 | End: 2024-08-07 | Stop reason: HOSPADM

## 2024-08-07 RX ORDER — LIDOCAINE HYDROCHLORIDE 10 MG/ML
1 INJECTION, SOLUTION EPIDURAL; INFILTRATION; INTRACAUDAL; PERINEURAL
Status: DISCONTINUED | OUTPATIENT
Start: 2024-08-07 | End: 2024-08-07 | Stop reason: HOSPADM

## 2024-08-07 RX ORDER — MIDAZOLAM HYDROCHLORIDE 1 MG/ML
INJECTION INTRAMUSCULAR; INTRAVENOUS
Status: COMPLETED
Start: 2024-08-07 | End: 2024-08-07

## 2024-08-07 RX ORDER — DEXAMETHASONE SODIUM PHOSPHATE 4 MG/ML
INJECTION, SOLUTION INTRA-ARTICULAR; INTRALESIONAL; INTRAMUSCULAR; INTRAVENOUS; SOFT TISSUE PRN
Status: DISCONTINUED | OUTPATIENT
Start: 2024-08-07 | End: 2024-08-07 | Stop reason: SDUPTHER

## 2024-08-07 RX ORDER — ONDANSETRON 2 MG/ML
4 INJECTION INTRAMUSCULAR; INTRAVENOUS
Status: DISCONTINUED | OUTPATIENT
Start: 2024-08-07 | End: 2024-08-07 | Stop reason: HOSPADM

## 2024-08-07 RX ORDER — ROPIVACAINE HYDROCHLORIDE 5 MG/ML
INJECTION, SOLUTION EPIDURAL; INFILTRATION; PERINEURAL
Status: COMPLETED
Start: 2024-08-07 | End: 2024-08-07

## 2024-08-07 RX ORDER — ROPIVACAINE HYDROCHLORIDE 5 MG/ML
INJECTION, SOLUTION EPIDURAL; INFILTRATION; PERINEURAL PRN
Status: DISCONTINUED | OUTPATIENT
Start: 2024-08-07 | End: 2024-08-07 | Stop reason: SDUPTHER

## 2024-08-07 RX ORDER — SODIUM CHLORIDE, SODIUM LACTATE, POTASSIUM CHLORIDE, CALCIUM CHLORIDE 600; 310; 30; 20 MG/100ML; MG/100ML; MG/100ML; MG/100ML
INJECTION, SOLUTION INTRAVENOUS CONTINUOUS PRN
Status: DISCONTINUED | OUTPATIENT
Start: 2024-08-07 | End: 2024-08-07 | Stop reason: SDUPTHER

## 2024-08-07 RX ORDER — ACETAMINOPHEN 500 MG
1000 TABLET ORAL EVERY 8 HOURS PRN
Qty: 180 TABLET | Refills: 0 | Status: SHIPPED | OUTPATIENT
Start: 2024-08-07

## 2024-08-07 RX ORDER — SODIUM CHLORIDE, SODIUM LACTATE, POTASSIUM CHLORIDE, CALCIUM CHLORIDE 600; 310; 30; 20 MG/100ML; MG/100ML; MG/100ML; MG/100ML
INJECTION, SOLUTION INTRAVENOUS CONTINUOUS
Status: DISCONTINUED | OUTPATIENT
Start: 2024-08-07 | End: 2024-08-07 | Stop reason: HOSPADM

## 2024-08-07 RX ORDER — FENTANYL CITRATE 50 UG/ML
25 INJECTION, SOLUTION INTRAMUSCULAR; INTRAVENOUS EVERY 5 MIN PRN
Status: DISCONTINUED | OUTPATIENT
Start: 2024-08-07 | End: 2024-08-07 | Stop reason: HOSPADM

## 2024-08-07 RX ORDER — NEOSTIGMINE METHYLSULFATE 1 MG/ML
INJECTION, SOLUTION INTRAVENOUS PRN
Status: DISCONTINUED | OUTPATIENT
Start: 2024-08-07 | End: 2024-08-07 | Stop reason: SDUPTHER

## 2024-08-07 RX ORDER — GLYCOPYRROLATE 0.2 MG/ML
INJECTION INTRAMUSCULAR; INTRAVENOUS PRN
Status: DISCONTINUED | OUTPATIENT
Start: 2024-08-07 | End: 2024-08-07 | Stop reason: SDUPTHER

## 2024-08-07 RX ORDER — FENTANYL CITRATE 50 UG/ML
INJECTION, SOLUTION INTRAMUSCULAR; INTRAVENOUS PRN
Status: DISCONTINUED | OUTPATIENT
Start: 2024-08-07 | End: 2024-08-07 | Stop reason: SDUPTHER

## 2024-08-07 RX ORDER — ACETAMINOPHEN 500 MG
1000 TABLET ORAL ONCE
Status: DISCONTINUED | OUTPATIENT
Start: 2024-08-07 | End: 2024-08-07 | Stop reason: HOSPADM

## 2024-08-07 RX ORDER — SODIUM CHLORIDE 0.9 % (FLUSH) 0.9 %
5-40 SYRINGE (ML) INJECTION PRN
Status: DISCONTINUED | OUTPATIENT
Start: 2024-08-07 | End: 2024-08-07 | Stop reason: HOSPADM

## 2024-08-07 RX ORDER — OXYCODONE HYDROCHLORIDE 5 MG/1
5 TABLET ORAL
Status: DISCONTINUED | OUTPATIENT
Start: 2024-08-07 | End: 2024-08-07 | Stop reason: HOSPADM

## 2024-08-07 RX ORDER — ONDANSETRON 2 MG/ML
INJECTION INTRAMUSCULAR; INTRAVENOUS PRN
Status: DISCONTINUED | OUTPATIENT
Start: 2024-08-07 | End: 2024-08-07 | Stop reason: SDUPTHER

## 2024-08-07 RX ORDER — OXYCODONE HYDROCHLORIDE 5 MG/1
5 TABLET ORAL EVERY 4 HOURS PRN
Qty: 20 TABLET | Refills: 0 | Status: SHIPPED | OUTPATIENT
Start: 2024-08-07 | End: 2024-08-14

## 2024-08-07 RX ORDER — ROCURONIUM BROMIDE 10 MG/ML
INJECTION, SOLUTION INTRAVENOUS PRN
Status: DISCONTINUED | OUTPATIENT
Start: 2024-08-07 | End: 2024-08-07 | Stop reason: SDUPTHER

## 2024-08-07 RX ORDER — ASPIRIN 81 MG/1
81 TABLET, CHEWABLE ORAL DAILY
Qty: 30 TABLET | Refills: 0 | Status: SHIPPED | OUTPATIENT
Start: 2024-08-07 | End: 2024-09-06

## 2024-08-07 RX ORDER — PROPOFOL 10 MG/ML
INJECTION, EMULSION INTRAVENOUS PRN
Status: DISCONTINUED | OUTPATIENT
Start: 2024-08-07 | End: 2024-08-07 | Stop reason: SDUPTHER

## 2024-08-07 RX ORDER — NALOXONE HYDROCHLORIDE 0.4 MG/ML
INJECTION, SOLUTION INTRAMUSCULAR; INTRAVENOUS; SUBCUTANEOUS PRN
Status: DISCONTINUED | OUTPATIENT
Start: 2024-08-07 | End: 2024-08-07 | Stop reason: HOSPADM

## 2024-08-07 RX ORDER — SODIUM CHLORIDE 9 MG/ML
INJECTION, SOLUTION INTRAVENOUS PRN
Status: DISCONTINUED | OUTPATIENT
Start: 2024-08-07 | End: 2024-08-07 | Stop reason: HOSPADM

## 2024-08-07 RX ORDER — WATER 10 ML/10ML
INJECTION INTRAMUSCULAR; INTRAVENOUS; SUBCUTANEOUS
Status: DISCONTINUED
Start: 2024-08-07 | End: 2024-08-07 | Stop reason: HOSPADM

## 2024-08-07 RX ORDER — MIDAZOLAM HYDROCHLORIDE 1 MG/ML
INJECTION INTRAMUSCULAR; INTRAVENOUS PRN
Status: DISCONTINUED | OUTPATIENT
Start: 2024-08-07 | End: 2024-08-07 | Stop reason: SDUPTHER

## 2024-08-07 RX ORDER — HYDROMORPHONE HYDROCHLORIDE 1 MG/ML
0.5 INJECTION, SOLUTION INTRAMUSCULAR; INTRAVENOUS; SUBCUTANEOUS EVERY 5 MIN PRN
Status: DISCONTINUED | OUTPATIENT
Start: 2024-08-07 | End: 2024-08-07 | Stop reason: HOSPADM

## 2024-08-07 RX ORDER — CEFAZOLIN SODIUM 1 G/3ML
INJECTION, POWDER, FOR SOLUTION INTRAMUSCULAR; INTRAVENOUS
Status: DISCONTINUED
Start: 2024-08-07 | End: 2024-08-07 | Stop reason: HOSPADM

## 2024-08-07 RX ADMIN — DEXAMETHASONE SODIUM PHOSPHATE 8 MG: 4 INJECTION, SOLUTION INTRA-ARTICULAR; INTRALESIONAL; INTRAMUSCULAR; INTRAVENOUS; SOFT TISSUE at 07:51

## 2024-08-07 RX ADMIN — GLYCOPYRROLATE 0.2 MG: 0.2 INJECTION INTRAMUSCULAR; INTRAVENOUS at 07:44

## 2024-08-07 RX ADMIN — FENTANYL CITRATE 50 MCG: 50 INJECTION, SOLUTION INTRAMUSCULAR; INTRAVENOUS at 09:20

## 2024-08-07 RX ADMIN — Medication 3 MG: at 09:33

## 2024-08-07 RX ADMIN — FENTANYL CITRATE 50 MCG: 50 INJECTION, SOLUTION INTRAMUSCULAR; INTRAVENOUS at 07:32

## 2024-08-07 RX ADMIN — ROPIVACAINE HYDROCHLORIDE 10 ML: 5 INJECTION, SOLUTION EPIDURAL; INFILTRATION; PERINEURAL at 07:12

## 2024-08-07 RX ADMIN — MIDAZOLAM HYDROCHLORIDE 2.5 MG: 1 INJECTION, SOLUTION INTRAMUSCULAR; INTRAVENOUS at 07:09

## 2024-08-07 RX ADMIN — SODIUM CHLORIDE, POTASSIUM CHLORIDE, SODIUM LACTATE AND CALCIUM CHLORIDE: 600; 310; 30; 20 INJECTION, SOLUTION INTRAVENOUS at 06:48

## 2024-08-07 RX ADMIN — ONDANSETRON HYDROCHLORIDE 4 MG: 2 INJECTION, SOLUTION INTRAMUSCULAR; INTRAVENOUS at 09:31

## 2024-08-07 RX ADMIN — ROCURONIUM BROMIDE 10 MG: 10 INJECTION INTRAVENOUS at 08:40

## 2024-08-07 RX ADMIN — ROCURONIUM BROMIDE 40 MG: 10 INJECTION INTRAVENOUS at 07:32

## 2024-08-07 RX ADMIN — LIDOCAINE HYDROCHLORIDE 100 MG: 20 INJECTION, SOLUTION EPIDURAL; INFILTRATION; INTRACAUDAL; PERINEURAL at 07:32

## 2024-08-07 RX ADMIN — PHENYLEPHRINE HYDROCHLORIDE 30 MCG/MIN: 10 INJECTION INTRAVENOUS at 07:44

## 2024-08-07 RX ADMIN — ROPIVACAINE HYDROCHLORIDE 5 ML: 5 INJECTION, SOLUTION EPIDURAL; INFILTRATION; PERINEURAL at 07:15

## 2024-08-07 RX ADMIN — SODIUM CHLORIDE, POTASSIUM CHLORIDE, SODIUM LACTATE AND CALCIUM CHLORIDE: 600; 310; 30; 20 INJECTION, SOLUTION INTRAVENOUS at 07:27

## 2024-08-07 RX ADMIN — GLYCOPYRROLATE 0.4 MG: 0.2 INJECTION INTRAMUSCULAR; INTRAVENOUS at 09:33

## 2024-08-07 RX ADMIN — TRANEXAMIC ACID 1000 MG: 100 INJECTION, SOLUTION INTRAVENOUS at 07:49

## 2024-08-07 RX ADMIN — WATER 2000 MG: 1 INJECTION INTRAMUSCULAR; INTRAVENOUS; SUBCUTANEOUS at 07:38

## 2024-08-07 RX ADMIN — PROPOFOL 130 MG: 10 INJECTION, EMULSION INTRAVENOUS at 07:32

## 2024-08-07 ASSESSMENT — PAIN DESCRIPTION - LOCATION: LOCATION: RIB CAGE

## 2024-08-07 ASSESSMENT — PAIN DESCRIPTION - DESCRIPTORS
DESCRIPTORS: ACHING
DESCRIPTORS: ACHING

## 2024-08-07 ASSESSMENT — PAIN SCALES - GENERAL: PAINLEVEL_OUTOF10: 3

## 2024-08-07 ASSESSMENT — PAIN DESCRIPTION - ORIENTATION: ORIENTATION: LEFT

## 2024-08-07 ASSESSMENT — PAIN - FUNCTIONAL ASSESSMENT
PAIN_FUNCTIONAL_ASSESSMENT: NONE - DENIES PAIN
PAIN_FUNCTIONAL_ASSESSMENT: 0-10

## 2024-08-07 NOTE — PROGRESS NOTES
Discharge instructions given. Patient and  verbalize understanding of instructions and follow up appointment.     Patient states ready for discharge, IV removed. Patient discharged by wheelchair with all belongings.  to provide transportation home.     Sling and ice pack in place to LUE

## 2024-08-07 NOTE — PROGRESS NOTES
Permission received to review discharge instructions and discuss private health information with , Grayson.    Patient states family/friend will be with them for 24 hours following procedure.       0717: Naif performed a LUE block with ultrasound. Patient on continuous cardiac and pulse oximetry monitoring throughout the procedure, nasal cannula 2 liters. Patient received 2.5 mg Versed. Vital signs stable. Patient tolerated procedure well. Patient drowsy but arouses when spoken to. Will continue to monitor.

## 2024-08-07 NOTE — ANESTHESIA PROCEDURE NOTES
Peripheral Block    Patient location during procedure: pre-op  Reason for block: post-op pain management and at surgeon's request  Start time: 8/7/2024 7:09 AM  End time: 8/7/2024 7:12 AM  Staffing  Performed: anesthesiologist   Anesthesiologist: Ronald Disla MD  Performed by: Ronald Disla MD  Authorized by: Ronald Disla MD    Preanesthetic Checklist  Completed: patient identified, IV checked, site marked, risks and benefits discussed, surgical/procedural consents, equipment checked, pre-op evaluation, timeout performed, anesthesia consent given, oxygen available, monitors applied/VS acknowledged and fire risk safety assessment completed and verbalized  Peripheral Block   Patient position: sitting  Prep: ChloraPrep  Provider prep: mask and sterile gloves  Patient monitoring: cardiac monitor, continuous pulse ox, continuous capnometry, frequent blood pressure checks, IV access, oxygen and responsive to questions  Block type: Brachial plexus  Interscalene  Laterality: left  Injection technique: single-shot  Guidance: ultrasound guided  Local infiltration: lidocaine  Infiltration strength: 2 %  Local infiltration: lidocaine  Dose: 2 mL    Needle   Needle type: insulated echogenic nerve stimulator needle   Needle gauge: 22 G  Needle localization: anatomical landmarks and ultrasound guidance  Needle length: 5 cm  Assessment   Injection assessment: negative aspiration for heme, no paresthesia on injection, local visualized surrounding nerve on ultrasound and no intravascular symptoms  Paresthesia pain: none  Slow fractionated injection: yes  Hemodynamics: stable  Outcomes: uncomplicated and patient tolerated procedure well

## 2024-08-07 NOTE — OP NOTE
Operative Report      Patient: Luz Hills MRN: [id number]  SSN: [id number]    YOB: 1960  Age: 64 y.o.  Sex: female      Date of Surgery: August 7, 2024    Surgery Location:  [facility]    Preoperative Diagnosis:      Left displaced distal clavicle fracture    Postoperative Diagnosis:    Same as above    Procedure(s) Performed:   Left open reduction with internal fixation of clavicle fracture     Implants:    Implant Name Type Inv. Item Serial No.  Lot No. LRB No. Used Action   PLATE BNE L74MM 3 H STD L LAT SUP CLAV TI DIXIE COMPR FOR - [id number]  PLATE BNE L74MM 3 H STD L LAT SUP CLAV TI DIXIE COMPR FOR  [employer] [facility]  Left 1 Implanted   SCREW BNE L16MM DIA2.7MM CANC TI DIXIE FULL THRD T10 DRV FOR - [id number]  SCREW BNE L16MM DIA2.7MM CANC TI DIXIE FULL THRD T10 DRV FOR  [employer] [facility]  Left 4 Implanted   SCREW BNE L14MM DIA3.5MM ERIN TI ST NONLOCKING FULL THRD - [id number]  SCREW BNE L14MM DIA3.5MM ERIN TI ST NONLOCKING FULL THRD  [employer] [facility]  Left 2 Implanted   SCREW BNE L12MM DIA3.5MM CANC TI DIXIE FULL THRD T10 DRV FOR - [id number]  SCREW BNE L12MM DIA3.5MM CANC TI DIXIE FULL THRD T10 DRV FOR  [employer] [facility]  Left 1 Implanted        Surgeon:  Mickey May MD    Assistant:  Physician Assistant: Francis Medrano PA.  A skilled assistant was necessary throughout the case, including positioning, prepping and draping, retraction and suction irrigation for adequate visualization, fracture reduction, wound closure, dressing placement, and hardware placement, sling placement .     Anesthesia:  General     Tourniquet time:  None    Estimated Blood Loss:  * No values recorded between 8/7/2024  7:26 AM and 8/7/2024  9:38 AM * 100 ml      Specimens:  None    Complications: * No complications entered in OR log * None    Indications: Luz Hills is a 64 y.o. female with a
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
## 2024-08-07 NOTE — ANESTHESIA PROCEDURE NOTES
Peripheral Block    Patient location during procedure: pre-op  Reason for block: post-op pain management and at surgeon's request  Start time: 8/7/2024 7:09 AM  End time: 8/7/2024 7:15 AM  Staffing  Performed: anesthesiologist   Anesthesiologist: Ronald Disla MD  Performed by: Ronald Disal MD  Authorized by: Ronald Disla MD    Preanesthetic Checklist  Completed: patient identified, IV checked, site marked, risks and benefits discussed, surgical/procedural consents, equipment checked, pre-op evaluation, timeout performed, anesthesia consent given, oxygen available, monitors applied/VS acknowledged and fire risk safety assessment completed and verbalized  Peripheral Block   Patient position: sitting  Prep: ChloraPrep  Provider prep: mask and sterile gloves  Patient monitoring: cardiac monitor, continuous pulse ox, continuous capnometry, frequent blood pressure checks, IV access, oxygen and responsive to questions  Block type: Cervical plexus  Superficial cervical  Laterality: left  Injection technique: single-shot  Guidance: ultrasound guided  Local infiltration: lidocaine  Infiltration strength: 2 %  Local infiltration: lidocaine  Dose: 2 mL    Needle   Needle type: insulated echogenic nerve stimulator needle   Needle gauge: 22 G  Needle localization: anatomical landmarks  Needle length: 5 cm  Assessment   Injection assessment: negative aspiration for heme, no paresthesia on injection, local visualized surrounding nerve on ultrasound and no intravascular symptoms  Paresthesia pain: none  Slow fractionated injection: yes  Hemodynamics: stable  Outcomes: uncomplicated and patient tolerated procedure well

## 2024-08-07 NOTE — DISCHARGE INSTRUCTIONS
[facility]     Dr. Mickey May   [address], [address]   [place] [address]    Phone: [phone], Physical Therapy Phone: [phone]      Post-Operative Instructions     Please follow these instructions for a safe and speedy recovery:     1. Medication   You may resume all home medications after surgery unless otherwise indicated.  Blood thinning medications should be resumed the day after surgery unless otherwise indicated.    A combination of the following medications will be prescribed after your surgery.  Not all the following medications will be prescribed for every patient after surgery.    A narcotic pain medication (oxycodone, hydrocodone, tramadol) - this should be taken sparingly for severe, breakthrough pain only.  You should take a stool softener and/or laxative (docusate (Colace), senna (Senekot), etc) while taking narcotics as they can cause severe constipation.  Overuse of narcotic pain medications can result in addiction as well as respiratory depression and death.     These pain medications are refilled on an individual basis and only during office hours.    Acetaminophen (Tylenol) - Do not take if you have liver disease.  Do not exceed 3500 mg of acetaminophen in any 24 hour period.   Non-steroidal anti-inflammatory medication (NSAID) - take as directed with food.  These include ibuprofen (Advil, Motrin), naproxen (Aleve, Naprosyn), meloxicam (Mobic), ketorolac (Toradol), indomethacin (Indocin), and others.  If you were prescribed ketorolac (Toradol), you may resume an over-the-counter NSAID after you finish the prescription.  You should only take one medication in this class at a time, regardless of whether it is prescribed or available over the counter.    Gabapentin (Neurontin) - take 100mg three times daily.  This medication can cause drowsiness.  Do not take if you use other medications or substances that cause

## 2024-10-08 NOTE — DISCHARGE SUMMARY
Discharge Summary       PATIENT ID: Nicolasa Christianson  MRN: [id number]   YOB: 1960    DATE OF ADMISSION: 9/17/2023  1:21 PM    DATE OF DISCHARGE: 9/19/2023   PRIMARY CARE PROVIDER: Eleazar Du MD     ATTENDING PHYSICIAN: Prince Carcamo  DISCHARGING PROVIDER: Prince Carcamo MD      CONSULTATIONS: IP CONSULT TO HOSPITALIST  IP CONSULT TO PSYCHIATRY  IP CONSULT TO SOCIAL WORK    PROCEDURES/SURGERIES: * No surgery found *    ADMISSION SUMMARY AND HOSPITAL COURSE:     HPI  Nicolasa Christianson is a 58 y.o. female who presents with alcohol intoxication. Patient has history of lupus and currently has acute lupus flare with increasing pain. Patient with known history of alcohol abuse, reports that she has been taking increasing amount of alcohol to try to control the pain caused by acute lupus flare. Patient reports taking multiple bottles of vodka. She is also taking scheduled Benadryl along with that. Patient reports also taking trazodone along with the above. Patient states that she has had multiple hospitalization for alcohol withdrawal and has had seizures in the past.  Patient denies any suicidal ideation. Patient was noted to have labile mood while in the ER. Further work-up shows patient's alcohol level is 393, acetaminophen and salicylate levels were unremarkable. Poison control was contacted from the ER. Hospital service requested admit the patient for further evaluation management. Hospital Course  Patient presented with alcohol intoxication with risk for withdrawal and seizure. Patient with significant history of alcohol use. Previously admitted for withdrawal in the past.  Patient has relapsed on alcohol use due to her relapsing lupus causing joint pain, after being sober for many years. On initial presentation, patient's alcohol level was 393, patient was monitored for withdrawal, started on phenobarb taper protocol. Overall patient remained stable.   Patient was also seen by psychiatry
Note in chart. I'm not sure where she wants this sent to. She should provide the information for [facility]. 
Patient aware script is ready.     Patient is also requesting an order for in home care. Please call when order is in chart.     Patient will like to  both script and order.    
[id number]  
Patients boyfriend, Roberto, is aware. NFA.   
Script completed and placed for . There are two separate tasks for this.
Spoke with patient. [facility] of [place] needs a script from the doctor stating that Roberto, her boyfriend, can be her home care giver.  Tanika Og MA  
There are two separate tasks in chart for this. I am unsure what in home care she is referring to. Per my prior message in the other task, I don't give a referral for a home health aid. I can put orders for PT/OT and a visiting nurse. Is this what she is asking for? 
HEENT:           Atraumatic, anicteric sclerae, pink conjunctivae                          No oral ulcers, mucosa moist, throat clear, dentition fair  Neck:               Supple, symmetrical  Lungs:             Clear to auscultation bilaterally. No Wheezing or Rhonchi. No rales. Chest wall:      No tenderness  No Accessory muscle use. Heart:              Regular  rhythm,  No  murmur   No edema  Abdomen:        Soft, non-tender. Not distended. Bowel sounds normal  Extremities:     No cyanosis. No clubbing,                            Skin turgor normal, Capillary refill normal  Skin:                Not pale. Not Jaundiced  No rashes   Psych:             Not anxious or agitated.   Neurologic:      Alert, moves all extremities, answers questions appropriately and responds to commands       CHRONIC MEDICAL DIAGNOSES:      Greater than 60 minutes were spent with the patient on counseling and coordination of care    Signed:   Rosanna Sales MD  9/19/2023  3:18 PM

## 2025-08-22 ENCOUNTER — TRANSCRIBE ORDERS (OUTPATIENT)
Facility: HOSPITAL | Age: 65
End: 2025-08-22

## 2025-08-22 DIAGNOSIS — M62.830 BACK SPASM: ICD-10-CM

## 2025-08-22 DIAGNOSIS — M54.16 RIGHT LUMBAR RADICULOPATHY: Primary | ICD-10-CM

## 2025-08-22 DIAGNOSIS — M47.819 FACET ARTHROPATHY: ICD-10-CM

## 2025-08-22 DIAGNOSIS — M54.50 LUMBAR PAIN: ICD-10-CM

## 2025-08-22 DIAGNOSIS — M51.362 DEGENERATION OF INTERVERTEBRAL DISC OF LUMBAR REGION WITH DISCOGENIC BACK PAIN AND LOWER EXTREMITY PAIN: ICD-10-CM

## 2025-09-03 ENCOUNTER — HOSPITAL ENCOUNTER (OUTPATIENT)
Facility: HOSPITAL | Age: 65
Discharge: HOME | End: 2025-09-06
Attending: ORTHOPAEDIC SURGERY
Payer: MEDICARE

## 2025-09-03 DIAGNOSIS — M47.819 FACET ARTHROPATHY: ICD-10-CM

## 2025-09-03 DIAGNOSIS — M54.50 LUMBAR PAIN: ICD-10-CM

## 2025-09-03 DIAGNOSIS — M51.362 DEGENERATION OF INTERVERTEBRAL DISC OF LUMBAR REGION WITH DISCOGENIC BACK PAIN AND LOWER EXTREMITY PAIN: ICD-10-CM

## 2025-09-03 DIAGNOSIS — M54.16 RIGHT LUMBAR RADICULOPATHY: ICD-10-CM

## 2025-09-03 DIAGNOSIS — M62.830 BACK SPASM: ICD-10-CM

## 2025-09-03 PROCEDURE — 72148 MRI LUMBAR SPINE W/O DYE: CPT

## (undated) DEVICE — 3M™ IOBAN™ 2 ANTIMICROBIAL INCISE DRAPE 6651EZ: Brand: IOBAN™ 2

## (undated) DEVICE — BLADE,CARBON-STEEL,15,STRL,DISPOSABLE,TB: Brand: MEDLINE

## (undated) DEVICE — GOWN,SIRUS,NONRNF,SETINSLV,XL,20/CS: Brand: MEDLINE

## (undated) DEVICE — SUTURE VICRYL + SZ 3-0 L27IN ABSRB UD L26MM SH 1/2 CIR VCP416H

## (undated) DEVICE — TENSIONER SUTURE

## (undated) DEVICE — Device

## (undated) DEVICE — GLOVE ORANGE PI 7 1/2   MSG9075

## (undated) DEVICE — LIQUIBAND RAPID ADHESIVE 36/CS 0.8ML: Brand: MEDLINE

## (undated) DEVICE — SYRINGE IRRIG 60ML SFT PLIABLE BLB EZ TO GRP 1 HND USE W/

## (undated) DEVICE — DRILL BIT, AO DIA2.0MM X 135MM, SCALED: Brand: VARIAX

## (undated) DEVICE — DRAPE,REIN 53X77,STERILE: Brand: MEDLINE

## (undated) DEVICE — SPONGE GZ W4XL4IN COT 12 PLY TYP VII WVN C FLD DSGN STERILE

## (undated) DEVICE — COVER,TABLE,HEAVY DUTY,79"X110",STRL: Brand: MEDLINE

## (undated) DEVICE — SUTURE VICRYL SZ 2-0 L27IN ABSRB UD L26MM SH 1/2 CIR J417H

## (undated) DEVICE — GLOVE ORANGE PI 7   MSG9070

## (undated) DEVICE — TUBING SUCT 12FR MAL ALUM SHFT FN CAP VENT UNIV CONN W/ OBT

## (undated) DEVICE — TIDISHIELD C-ARM EQUIPMENT COVERS CLEAR POLYETHYLENE STERILE 42IN X 140IN 10 PER CASE: Brand: TIDISHIELD

## (undated) DEVICE — SUTURE MONOCRYL SZ 3-0 L27IN ABSRB UD L26MM SH 1/2 CIR Y416H

## (undated) DEVICE — GLOVE SURG SZ 65 THK91MIL LTX FREE SYN POLYISOPRENE

## (undated) DEVICE — GLOVE SURG SZ 75 L12IN FNGR THK79MIL GRN LTX FREE

## (undated) DEVICE — PENCIL ES CRD L10FT HND SWCHING ROCK SWCH W/ EDGE COAT BLDE

## (undated) DEVICE — UNTHREADED GUIDE WIRE: Brand: FIXOS

## (undated) DEVICE — DRILL BIT, AO DIA2.6MM X 135MM, SCALED: Brand: VARIAX

## (undated) DEVICE — APPLICATOR MEDICATED 26 CC SOLUTION HI LT ORNG CHLORAPREP

## (undated) DEVICE — INTENDED TO SUPPORT AND MAINTAIN THE POSITION OF AN ANESTHETIZED PATIENT DURING SURGERY: Brand: ERIN BEACH CHAIR FACE MASK

## (undated) DEVICE — ELECTRODE PT RET AD L9FT HI MOIST COND ADH HYDRGEL CORDED

## (undated) DEVICE — PACK,SHOULDER II,DRAPE: Brand: MEDLINE

## (undated) DEVICE — DRAPE,U/ SHT,SPLIT,PLAS,STERIL: Brand: MEDLINE

## (undated) DEVICE — COVER,MAYO STAND,STERILE: Brand: MEDLINE

## (undated) DEVICE — DRESSING HYDROFIBER AQUACEL AG ADVANTAGE 3.5X10 IN

## (undated) DEVICE — STOCKINETTE,IMPERVIOUS,12X48,STERILE: Brand: MEDLINE

## (undated) DEVICE — TIP SUCT CRV REG REDI

## (undated) DEVICE — HYPODERMIC SAFETY NEEDLE: Brand: MAGELLAN

## (undated) DEVICE — SUTURE VICRYL SZ 0 L36IN ABSRB UD L36MM CT-1 1/2 CIR J946H

## (undated) DEVICE — GLOVE SURG SZ 65 L12IN FNGR THK79MIL GRN LTX FREE

## (undated) DEVICE — TAPE FIBER CARCLAGE W/ TIGER LINK (ORDER MUTLIPLES OF 5 EACH)

## (undated) DEVICE — SURGICAL PROCEDURE PACK BASIN MAJ SET CUST NO CAUT

## (undated) DEVICE — SUTURE VICRYL SZ 3-0 L27IN ABSRB UD L36MM CT-1 1/2 CIR J258H

## (undated) DEVICE — SNAP KOVER: Brand: UNBRANDED

## (undated) DEVICE — SOLUTION IRRIG 1000ML 0.9% SOD CHL USP POUR PLAS BTL

## (undated) DEVICE — GLOVE SURG SZ 8 L12IN FNGR THK79MIL GRN LTX FREE

## (undated) DEVICE — SPONGE LAP W18XL18IN WHT COT 4 PLY FLD STRUNG RADPQ DISP ST 2 PER PACK

## (undated) DEVICE — GLOVE ORANGE PI 8   MSG9080

## (undated) DEVICE — 4-PORT MANIFOLD: Brand: NEPTUNE 2

## (undated) DEVICE — TUBING, SUCTION, 1/4" X 10', STRAIGHT: Brand: MEDLINE

## (undated) DEVICE — 3M™ STERI-DRAPE™ U-DRAPE 1015: Brand: STERI-DRAPE™

## (undated) DEVICE — BANDAGE COBAN 4 IN COMPR W4INXL5YD FOAM COHESIVE QUIK STK SELF ADH SFT